# Patient Record
Sex: FEMALE | Race: WHITE | NOT HISPANIC OR LATINO | URBAN - METROPOLITAN AREA
[De-identification: names, ages, dates, MRNs, and addresses within clinical notes are randomized per-mention and may not be internally consistent; named-entity substitution may affect disease eponyms.]

---

## 2020-07-29 ENCOUNTER — OFFICE VISIT (OUTPATIENT)
Dept: URBAN - METROPOLITAN AREA CLINIC 12 | Facility: CLINIC | Age: 75
End: 2020-07-29

## 2020-08-10 ENCOUNTER — OFFICE VISIT (OUTPATIENT)
Dept: URBAN - METROPOLITAN AREA CLINIC 12 | Facility: CLINIC | Age: 75
End: 2020-08-10
Payer: MEDICARE

## 2020-08-10 ENCOUNTER — DASHBOARD ENCOUNTERS (OUTPATIENT)
Age: 75
End: 2020-08-10

## 2020-08-10 VITALS
DIASTOLIC BLOOD PRESSURE: 70 MMHG | BODY MASS INDEX: 23.52 KG/M2 | HEART RATE: 71 BPM | HEIGHT: 62 IN | SYSTOLIC BLOOD PRESSURE: 144 MMHG | WEIGHT: 127.8 LBS | TEMPERATURE: 97.8 F

## 2020-08-10 DIAGNOSIS — K57.30 DIVERTICULA OF COLON: ICD-10-CM

## 2020-08-10 DIAGNOSIS — K59.09 CHRONIC CONSTIPATION: ICD-10-CM

## 2020-08-10 DIAGNOSIS — Z86.010 HISTORY OF ADENOMATOUS POLYP OF COLON: ICD-10-CM

## 2020-08-10 DIAGNOSIS — Q43.8 TORTUOUS COLON: ICD-10-CM

## 2020-08-10 PROCEDURE — G8420 CALC BMI NORM PARAMETERS: HCPCS | Performed by: INTERNAL MEDICINE

## 2020-08-10 PROCEDURE — 3017F COLORECTAL CA SCREEN DOC REV: CPT | Performed by: INTERNAL MEDICINE

## 2020-08-10 PROCEDURE — G9903 PT SCRN TBCO ID AS NON USER: HCPCS | Performed by: INTERNAL MEDICINE

## 2020-08-10 PROCEDURE — 99213 OFFICE O/P EST LOW 20 MIN: CPT | Performed by: INTERNAL MEDICINE

## 2020-08-10 PROCEDURE — G8427 DOCREV CUR MEDS BY ELIG CLIN: HCPCS | Performed by: INTERNAL MEDICINE

## 2020-08-10 RX ORDER — SODIUM, POTASSIUM,MAG SULFATES 17.5-3.13G
354 ML SOLUTION, RECONSTITUTED, ORAL ORAL ONCE
Qty: 354 MILLILITER | Refills: 0 | OUTPATIENT
Start: 2020-08-10

## 2020-08-10 RX ORDER — LUBIPROSTONE 24 UG/1
TAKE 1 CAPSULE (24 MCG) BY ORAL ROUTE 2 TIMES PER DAY WITH FOOD AND WATER FOR 30 DAYS CAPSULE, GELATIN COATED ORAL 2
Qty: 60 | Refills: 11 | Status: DISCONTINUED | COMMUNITY
Start: 2017-07-18

## 2020-08-10 NOTE — HPI-TODAY'S VISIT:
This is a 75-year-old woman who presents for evaluation.  She had been followed by Dr. Stefano Waldron in the past.  She has a history of chronic constipation.  She also has a history of a very tortuous colon with significant looping noted on colonoscopies.  She had an adenomatous polyp removed from the sigmoid colon in 2012 with diverticulosis internal hemorrhoids noted and had a negative exam in 2015 by Dr. Stefano Waldron.  She sees Marcelle Cortes PA-C for primary care and routine healthcare maintenance. She has chronic constipation and takea a stool softeners and laxative over the counter to help her have BMs most days. Often small amonts of stool. No bleeding. No abdominal pain. No weight loss. No fever or chills. No nausea or vomiting. No chest pain or shortness of breath. No anemia. No COVID-19 exposures.  No history of diverticulitis.

## 2020-09-22 ENCOUNTER — OFFICE VISIT (OUTPATIENT)
Dept: URBAN - METROPOLITAN AREA LAB 3 | Facility: LAB | Age: 75
End: 2020-09-22
Payer: MEDICARE

## 2020-09-22 DIAGNOSIS — K63.89 CECUM MASS: ICD-10-CM

## 2020-09-22 DIAGNOSIS — Z86.010 H/O ADENOMATOUS POLYP OF COLON: ICD-10-CM

## 2020-09-22 PROCEDURE — 45385 COLONOSCOPY W/LESION REMOVAL: CPT | Performed by: INTERNAL MEDICINE

## 2020-09-22 PROCEDURE — G9936 PMH PLYP/NEO CO/RECT/JUN/ANS: HCPCS | Performed by: INTERNAL MEDICINE

## 2025-05-30 ENCOUNTER — OFFICE VISIT (OUTPATIENT)
Dept: URBAN - METROPOLITAN AREA CLINIC 12 | Facility: CLINIC | Age: 80
End: 2025-05-30

## 2025-06-06 ENCOUNTER — OFFICE VISIT (OUTPATIENT)
Dept: URBAN - METROPOLITAN AREA CLINIC 12 | Facility: CLINIC | Age: 80
End: 2025-06-06
Payer: COMMERCIAL

## 2025-06-06 DIAGNOSIS — R14.2 BELCHING SYMPTOM: ICD-10-CM

## 2025-06-06 DIAGNOSIS — Z86.0101 HISTORY OF ADENOMATOUS POLYP OF COLON: ICD-10-CM

## 2025-06-06 DIAGNOSIS — I47.10 SUPRAVENTRICULAR TACHYCARDIA: ICD-10-CM

## 2025-06-06 DIAGNOSIS — K30 INDIGESTION: ICD-10-CM

## 2025-06-06 PROBLEM — 275299008: Status: ACTIVE | Noted: 2025-06-06

## 2025-06-06 PROBLEM — 429047008: Status: ACTIVE | Noted: 2025-06-06

## 2025-06-06 PROBLEM — 162031009: Status: ACTIVE | Noted: 2025-06-06

## 2025-06-06 PROBLEM — 6456007: Status: ACTIVE | Noted: 2025-06-06

## 2025-06-06 PROCEDURE — 99204 OFFICE O/P NEW MOD 45 MIN: CPT

## 2025-06-06 NOTE — HPI-TODAY'S VISIT:
Patient is a 80-year-old-year-old presents today for a colon consult.  History adenomatous colon polyp in , and tortuous colon with significant looping noted on colonoscopies.    She was last seen in the office by Dr. Parkinson in  and had a colonoscopy done which showed a hyperplastic polyp in the ascending colon, diverticulosis, internal and external hemorrhoids.  No repeat colonoscopy recommended due to age.   Today she reports that 3 people around her have  of colon cancer in their 80s and desires a surveillance colonoscopy. Denies changes in bowel habits, abdominal pain, blood in the stool, or any other concerns. No FH of CRC.  She also complains of increased indigestion and burping for the past month. Has been taking pantoprazole prescribed by her PCP for a few weeks without significant symptom improvement. Now taking famotidine daily with some symptom improvement. Denies nausea, vomiting, dysphagia, acid reflux, epigastric pain, or melena. Reports history of SVT, followed by a cardiologist every 6 months. Denies problems with lungs or kidneys. No trouble with anesthesia in the past.

## 2025-08-25 ENCOUNTER — OFFICE VISIT (OUTPATIENT)
Dept: URBAN - METROPOLITAN AREA LAB 3 | Facility: LAB | Age: 80
End: 2025-08-25